# Patient Record
Sex: FEMALE | URBAN - METROPOLITAN AREA
[De-identification: names, ages, dates, MRNs, and addresses within clinical notes are randomized per-mention and may not be internally consistent; named-entity substitution may affect disease eponyms.]

---

## 2023-11-15 ENCOUNTER — CLAIMS CREATED FROM THE CLAIM WINDOW (OUTPATIENT)
Dept: URBAN - METROPOLITAN AREA MEDICAL CENTER 19 | Facility: MEDICAL CENTER | Age: 73
End: 2023-11-15
Payer: MEDICARE

## 2023-11-15 DIAGNOSIS — K92.1 MELENA: ICD-10-CM

## 2023-11-15 DIAGNOSIS — D50.9 IRON DEFICIENCY ANEMIA, UNSPECIFIED IRON DEFICIENCY ANEMIA TYPE: ICD-10-CM

## 2023-11-15 DIAGNOSIS — Z79.01 LONG TERM (CURRENT) USE OF ANTICOAGULANTS: ICD-10-CM

## 2023-11-15 PROCEDURE — 99222 1ST HOSP IP/OBS MODERATE 55: CPT | Performed by: PHYSICIAN ASSISTANT

## 2023-11-15 PROCEDURE — 99284 EMERGENCY DEPT VISIT MOD MDM: CPT | Performed by: PHYSICIAN ASSISTANT

## 2023-11-16 ENCOUNTER — CLAIMS CREATED FROM THE CLAIM WINDOW (OUTPATIENT)
Dept: URBAN - METROPOLITAN AREA MEDICAL CENTER 19 | Facility: MEDICAL CENTER | Age: 73
End: 2023-11-16
Payer: MEDICARE

## 2023-11-16 ENCOUNTER — TELEPHONE ENCOUNTER (OUTPATIENT)
Dept: URBAN - METROPOLITAN AREA CLINIC 113 | Facility: CLINIC | Age: 73
End: 2023-11-16

## 2023-11-16 DIAGNOSIS — K92.1 MELENA: ICD-10-CM

## 2023-11-16 DIAGNOSIS — K25.9 GASTRIC EROSION, UNSPECIFIED CHRONICITY: ICD-10-CM

## 2023-11-16 DIAGNOSIS — K31.819 ANGIODYSPLASIA OF STOMACH AND DUODENUM WITHOUT BLEEDING: ICD-10-CM

## 2023-11-16 DIAGNOSIS — D62 ACUTE POSTHEMORRHAGIC ANEMIA: ICD-10-CM

## 2023-11-16 DIAGNOSIS — K55.20 ANGIODYSPLASIA OF COLON WITHOUT HEMORRHAGE: ICD-10-CM

## 2023-11-16 PROCEDURE — 45381 COLONOSCOPY SUBMUCOUS NJX: CPT | Performed by: INTERNAL MEDICINE

## 2023-11-16 PROCEDURE — 43255 EGD CONTROL BLEEDING ANY: CPT | Performed by: INTERNAL MEDICINE

## 2023-11-16 PROCEDURE — 43270 EGD LESION ABLATION: CPT | Performed by: INTERNAL MEDICINE

## 2023-11-16 PROCEDURE — 45388 COLONOSCOPY W/ABLATION: CPT | Performed by: INTERNAL MEDICINE

## 2023-11-16 PROCEDURE — 43239 EGD BIOPSY SINGLE/MULTIPLE: CPT | Performed by: INTERNAL MEDICINE

## 2024-02-01 ENCOUNTER — OV NP (OUTPATIENT)
Dept: URBAN - METROPOLITAN AREA CLINIC 113 | Facility: CLINIC | Age: 74
End: 2024-02-01

## 2025-07-15 ENCOUNTER — DASHBOARD ENCOUNTERS (OUTPATIENT)
Age: 75
End: 2025-07-15

## 2025-07-15 ENCOUNTER — LAB OUTSIDE AN ENCOUNTER (OUTPATIENT)
Dept: URBAN - METROPOLITAN AREA CLINIC 113 | Facility: CLINIC | Age: 75
End: 2025-07-15

## 2025-07-15 ENCOUNTER — OFFICE VISIT (OUTPATIENT)
Dept: URBAN - METROPOLITAN AREA CLINIC 113 | Facility: CLINIC | Age: 75
End: 2025-07-15
Payer: MEDICARE

## 2025-07-15 DIAGNOSIS — R10.813 RIGHT LOWER QUADRANT ABDOMINAL TENDERNESS WITHOUT REBOUND TENDERNESS: ICD-10-CM

## 2025-07-15 PROCEDURE — 99213 OFFICE O/P EST LOW 20 MIN: CPT | Performed by: NURSE PRACTITIONER

## 2025-07-15 NOTE — HPI-TODAY'S VISIT:
75-year-old woman who was initially seen in consultation in November 2023 with Dr. Paul Gavin, having presented for melena in the setting of starting Plavix following a Watchman 2 weeks prior.  Past medical history significant for atrial fibrillation, GERD and tobacco use.  Diverticular hemorrhage was within the differential.  She was recommended EGD and colonoscopy.  An EGD 11/16/2023 was notable for a normal esophagus, regular Z-line 38 cm from the incisors, erosive gastritis status post biopsies, a single nonbleeding angiectasia in the stomach treated with APC, a single angiectasia in the duodenum treated with APC, normal second portion and third portion of the duodenum.  Stomach biopsies were negative for H. pylori.  Same-day colonoscopy was notable for a fair bowel preparation, normal terminal ileum, 3 colonic angiectasia's treated with APC, diverticulosis in the sigmoid and descending colon.  Normal rectum.  The colon did appear tortuous.  No blood or bleeding noted in the colon.  No specimens were collected.  She presents today for a new complaint of discomfort just above her navel.  About one month ago, she noted a tender area to the right lower/ 8 o'clock position below her navel. The tenderness is about the "size of a 50 cent piece." Pain does not seem changed with movement or position changes. She has increased bloating with eating or drinking, which can make the tenderness worse. She has bowel movements daily each morning without blood or melena. Appetite is fair. There is no nausea or vomiting. There is no trouble with swallowing. She does have indigestion, having started Prilosec over the counter one month ago. She has not had any heartburn since adding the Prilosec. She has also added a probiotic.

## 2025-08-15 ENCOUNTER — OFFICE VISIT (OUTPATIENT)
Dept: URBAN - METROPOLITAN AREA CLINIC 113 | Facility: CLINIC | Age: 75
End: 2025-08-15
Payer: MEDICARE

## 2025-08-15 DIAGNOSIS — R10.813 RIGHT LOWER QUADRANT ABDOMINAL TENDERNESS WITHOUT REBOUND TENDERNESS: ICD-10-CM

## 2025-08-15 PROCEDURE — 99213 OFFICE O/P EST LOW 20 MIN: CPT | Performed by: NURSE PRACTITIONER

## 2025-08-21 ENCOUNTER — TELEPHONE ENCOUNTER (OUTPATIENT)
Dept: URBAN - METROPOLITAN AREA CLINIC 113 | Facility: CLINIC | Age: 75
End: 2025-08-21